# Patient Record
Sex: FEMALE | Race: WHITE | Employment: FULL TIME | URBAN - NONMETROPOLITAN AREA
[De-identification: names, ages, dates, MRNs, and addresses within clinical notes are randomized per-mention and may not be internally consistent; named-entity substitution may affect disease eponyms.]

---

## 2021-02-06 ENCOUNTER — HOSPITAL ENCOUNTER (INPATIENT)
Age: 31
LOS: 5 days | Discharge: NURSING, REHAB OR LONG-TERM CARE | DRG: 773 | End: 2021-02-11
Attending: INTERNAL MEDICINE | Admitting: FAMILY MEDICINE
Payer: COMMERCIAL

## 2021-02-06 DIAGNOSIS — F11.93 OPIATE WITHDRAWAL (HCC): ICD-10-CM

## 2021-02-06 LAB
ALBUMIN SERPL-MCNC: 3.9 G/DL (ref 3.5–4.7)
ALBUMIN/GLOB SERPL: 1.2 {RATIO}
ALP SERPL-CCNC: 63 U/L (ref 38–126)
ALT SERPL-CCNC: 19 U/L (ref 3–52)
AMPHET UR QL SCN: NEGATIVE
ANION GAP SERPL CALC-SCNC: 10 MMOL/L
APPEARANCE UR: CLEAR
AST SERPL W P-5'-P-CCNC: 20 U/L (ref 14–74)
BACTERIA URNS QL MICRO: ABNORMAL /HPF
BARBITURATES UR QL SCN: NEGATIVE
BASOPHILS # BLD: 0 K/UL (ref 0–0.1)
BASOPHILS NFR BLD: 0 % (ref 0–2)
BENZODIAZ UR QL: NEGATIVE
BILIRUB SERPL-MCNC: 0.4 MG/DL (ref 0.2–1)
BILIRUB UR QL: NEGATIVE
BUN SERPL-MCNC: 17 MG/DL (ref 9–21)
BUN/CREAT SERPL: 43
CA-I BLD-MCNC: 8.3 MG/DL (ref 8.5–10.5)
CANNABINOIDS UR QL SCN: NEGATIVE
CHLORIDE SERPL-SCNC: 105 MMOL/L (ref 94–111)
CO2 SERPL-SCNC: 20 MMOL/L (ref 21–33)
COCAINE UR QL SCN: NEGATIVE
COLOR UR: ABNORMAL
COVID-19 RAPID TEST, COVR: NOT DETECTED
CREAT SERPL-MCNC: 0.4 MG/DL (ref 0.7–1.2)
DRUG SCRN COMMENT,DRGCM: ABNORMAL
EOSINOPHIL # BLD: 0.2 K/UL (ref 0–0.4)
EOSINOPHIL NFR BLD: 2 % (ref 0–5)
EPITH CASTS URNS QL MICRO: ABNORMAL /LPF (ref 0–20)
ERYTHROCYTE [DISTWIDTH] IN BLOOD BY AUTOMATED COUNT: 14.2 % (ref 11.6–14.5)
ETHANOL PERCENT, ALCP: <0.004 %
ETHANOL SERPL-MCNC: <4 MG/DL
GLOBULIN SER CALC-MCNC: 3.3 G/DL
GLUCOSE SERPL-MCNC: 83 MG/DL (ref 70–110)
GLUCOSE UR STRIP.AUTO-MCNC: NEGATIVE MG/DL
HCG UR QL: NEGATIVE
HCT VFR BLD AUTO: 39.1 % (ref 35–45)
HGB BLD-MCNC: 12.3 G/DL (ref 12–16)
HGB UR QL STRIP: NEGATIVE
IMM GRANULOCYTES # BLD AUTO: 0 K/UL
IMM GRANULOCYTES NFR BLD AUTO: 0 %
KETONES UR QL STRIP.AUTO: NEGATIVE MG/DL
LEUKOCYTE ESTERASE UR QL STRIP.AUTO: ABNORMAL
LYMPHOCYTES # BLD: 2.3 K/UL (ref 0.9–3.6)
LYMPHOCYTES NFR BLD: 27 % (ref 21–52)
MCH RBC QN AUTO: 26.6 PG (ref 24–34)
MCHC RBC AUTO-ENTMCNC: 31.5 G/DL (ref 31–37)
MCV RBC AUTO: 84.6 FL (ref 74–97)
METHADONE UR QL: POSITIVE
MONOCYTES # BLD: 0.6 K/UL (ref 0.05–1.2)
MONOCYTES NFR BLD: 8 % (ref 3–10)
MUCOUS THREADS URNS QL MICRO: NEGATIVE /LPF
NEUTS SEG # BLD: 5.2 K/UL (ref 1.8–8)
NEUTS SEG NFR BLD: 63 % (ref 40–73)
NITRITE UR QL STRIP.AUTO: NEGATIVE
OPIATES UR QL: POSITIVE
OXYCODONE UR QL SCN: NEGATIVE
PCP UR QL: POSITIVE
PH UR STRIP: 5 [PH] (ref 5–9)
PLATELET # BLD AUTO: 267 K/UL (ref 135–420)
PMV BLD AUTO: 9 FL (ref 9.2–11.8)
POTASSIUM SERPL-SCNC: 4 MMOL/L (ref 3.2–5.1)
PROPOXYPH UR QL: NEGATIVE
PROT SERPL-MCNC: 7.2 G/DL (ref 6.1–8.4)
PROT UR STRIP-MCNC: NEGATIVE MG/DL
RBC # BLD AUTO: 4.62 M/UL (ref 4.2–5.3)
RBC #/AREA URNS HPF: ABNORMAL /HPF (ref 0–2)
SARS-COV-2, COV2: NORMAL
SARS-COV-2, COV2: NORMAL
SODIUM SERPL-SCNC: 135 MMOL/L (ref 135–145)
SP GR UR REFRACTOMETRY: 1.02 (ref 1–1.03)
SPECIMEN SOURCE: NORMAL
TRICYCLICS UR QL: NEGATIVE
UROBILINOGEN UR QL STRIP.AUTO: 0.2 EU/DL (ref 0.2–1)
WBC # BLD AUTO: 8.4 K/UL (ref 4.6–13.2)
WBC URNS QL MICRO: ABNORMAL /HPF (ref 0–4)

## 2021-02-06 PROCEDURE — 85025 COMPLETE CBC W/AUTO DIFF WBC: CPT

## 2021-02-06 PROCEDURE — 80307 DRUG TEST PRSMV CHEM ANLYZR: CPT

## 2021-02-06 PROCEDURE — 99222 1ST HOSP IP/OBS MODERATE 55: CPT | Performed by: FAMILY MEDICINE

## 2021-02-06 PROCEDURE — 82077 ASSAY SPEC XCP UR&BREATH IA: CPT

## 2021-02-06 PROCEDURE — U0003 INFECTIOUS AGENT DETECTION BY NUCLEIC ACID (DNA OR RNA); SEVERE ACUTE RESPIRATORY SYNDROME CORONAVIRUS 2 (SARS-COV-2) (CORONAVIRUS DISEASE [COVID-19]), AMPLIFIED PROBE TECHNIQUE, MAKING USE OF HIGH THROUGHPUT TECHNOLOGIES AS DESCRIBED BY CMS-2020-01-R: HCPCS

## 2021-02-06 PROCEDURE — 81025 URINE PREGNANCY TEST: CPT

## 2021-02-06 PROCEDURE — HZ2ZZZZ DETOXIFICATION SERVICES FOR SUBSTANCE ABUSE TREATMENT: ICD-10-PCS | Performed by: FAMILY MEDICINE

## 2021-02-06 PROCEDURE — 74011250637 HC RX REV CODE- 250/637: Performed by: FAMILY MEDICINE

## 2021-02-06 PROCEDURE — 81001 URINALYSIS AUTO W/SCOPE: CPT

## 2021-02-06 PROCEDURE — 80053 COMPREHEN METABOLIC PANEL: CPT

## 2021-02-06 PROCEDURE — 65270000029 HC RM PRIVATE

## 2021-02-06 PROCEDURE — 86592 SYPHILIS TEST NON-TREP QUAL: CPT

## 2021-02-06 PROCEDURE — 87635 SARS-COV-2 COVID-19 AMP PRB: CPT

## 2021-02-06 RX ORDER — CALCIUM CARBONATE 200(500)MG
400 TABLET,CHEWABLE ORAL
Status: DISCONTINUED | OUTPATIENT
Start: 2021-02-06 | End: 2021-02-11

## 2021-02-06 RX ORDER — IBUPROFEN 200 MG
1 TABLET ORAL DAILY
Status: DISCONTINUED | OUTPATIENT
Start: 2021-02-06 | End: 2021-02-08

## 2021-02-06 RX ORDER — CYCLOBENZAPRINE HCL 10 MG
10 TABLET ORAL
Status: DISCONTINUED | OUTPATIENT
Start: 2021-02-06 | End: 2021-02-11

## 2021-02-06 RX ORDER — HYDROXYZINE PAMOATE 25 MG/1
50 CAPSULE ORAL
Status: DISCONTINUED | OUTPATIENT
Start: 2021-02-06 | End: 2021-02-11

## 2021-02-06 RX ORDER — IBUPROFEN 600 MG/1
600 TABLET ORAL
Status: DISCONTINUED | OUTPATIENT
Start: 2021-02-06 | End: 2021-02-11

## 2021-02-06 RX ORDER — TRAZODONE HYDROCHLORIDE 50 MG/1
100 TABLET ORAL
Status: DISCONTINUED | OUTPATIENT
Start: 2021-02-06 | End: 2021-02-07

## 2021-02-06 RX ORDER — DOCUSATE SODIUM 100 MG/1
100 CAPSULE, LIQUID FILLED ORAL
Status: DISCONTINUED | OUTPATIENT
Start: 2021-02-06 | End: 2021-02-11

## 2021-02-06 RX ORDER — LANOLIN ALCOHOL/MO/W.PET/CERES
9 CREAM (GRAM) TOPICAL
Status: DISCONTINUED | OUTPATIENT
Start: 2021-02-06 | End: 2021-02-11

## 2021-02-06 RX ORDER — DICYCLOMINE HYDROCHLORIDE 10 MG/1
10 CAPSULE ORAL
Status: DISCONTINUED | OUTPATIENT
Start: 2021-02-06 | End: 2021-02-11

## 2021-02-06 RX ORDER — IBUPROFEN 200 MG
1 TABLET ORAL DAILY
Status: DISCONTINUED | OUTPATIENT
Start: 2021-02-07 | End: 2021-02-06

## 2021-02-06 RX ORDER — CLONIDINE HYDROCHLORIDE 0.1 MG/1
0.1 TABLET ORAL
Status: DISCONTINUED | OUTPATIENT
Start: 2021-02-06 | End: 2021-02-11

## 2021-02-06 RX ORDER — MAG HYDROX/ALUMINUM HYD/SIMETH 200-200-20
30 SUSPENSION, ORAL (FINAL DOSE FORM) ORAL
Status: DISCONTINUED | OUTPATIENT
Start: 2021-02-06 | End: 2021-02-11

## 2021-02-06 RX ORDER — CALCIUM CARBONATE 200(500)MG
200 TABLET,CHEWABLE ORAL
Status: DISCONTINUED | OUTPATIENT
Start: 2021-02-06 | End: 2021-02-06

## 2021-02-06 RX ORDER — ACETAMINOPHEN 325 MG/1
650 TABLET ORAL
Status: DISCONTINUED | OUTPATIENT
Start: 2021-02-06 | End: 2021-02-11

## 2021-02-06 RX ORDER — ONDANSETRON 4 MG/1
8 TABLET, ORALLY DISINTEGRATING ORAL
Status: DISCONTINUED | OUTPATIENT
Start: 2021-02-06 | End: 2021-02-11

## 2021-02-06 RX ORDER — GUAIFENESIN 100 MG/5ML
100 SOLUTION ORAL
Status: DISCONTINUED | OUTPATIENT
Start: 2021-02-06 | End: 2021-02-11

## 2021-02-06 RX ADMIN — TRAZODONE HYDROCHLORIDE 100 MG: 50 TABLET ORAL at 21:52

## 2021-02-06 NOTE — PROGRESS NOTES
Received for care 35 y.o. female with Acute Opioid withdrawal. Admit to using methadone 19 mg and fentanyl 0.4 gm yesterday about 10 AM and use daily. Report of feeling anxious and nose running. Skin noted flushed. COWS score 11. COVID-19 test and Droplet isolation explained. Verbalize understanding. Dr. Rogelio Bolton notified of patient's arrival and current condition. Will complete assessments and orders on next rounds. Marissa Patel

## 2021-02-06 NOTE — BH NOTES
Chemical Dependency/Substance Abuse Therapist/Counselor Documentation    /THERAPIST PROGRESS NOTE    CURRENT STATUS OF PATIENT: Orientation (check one) [x] Person [x] Place [x] Time [x] Purpose    (Patient voiced concerns: Patient is alert and oriented x4. No concerns at this time.)    Attitude/Behavior toward Examiner:   [x] Appropriate [x] Cooperative [] Aggressive [] Argumentative [] Angry [] Apathetic [] Passive  [] Childlike [] Interactive [] Guarded [] Demanding [] Dramatic [] Evasive [] Hostile [] Irritable [] Manipulative   [] Uncooperative [] Difficult to redirect [] Isolative-Withdrawal [] Sad-Tearful [] Suspicious [] Defensive    TREATMENT PLAN PROBLEM BEING ADDRESS: Patient presented to the unit for opioid detox.     MODALITY:  [x] Individual Therapy  [] Group Therapy  [] Family Therapy with Client Present  [] Family Therapy without Client Present  [] Multi-Family Group Therapy  [] Spirituality Psychotherapy Group  [] Psycho-Edutherapy Group    RISK OF HARM:    [x] None identified    [] Self injurious behavior  [] Threatening others without a plan   [] Actively Suicidal with plan  [] Threatening others with plan  [] Suicidal Thoughts without plan [] Weapon in the home  [] Means to complete a plan    Comments: No risk assessed,  General Appearance: [x] Normal [] Disheveled [] Emaciated [] Obese [] Poor Hygiene    Dress: [x] Appropriate [] Eccentric [] Seductive [] Bizarre    Mood: [x] Euthymic/normal [] Dysphoric/unease [] Anxious [] Agitated [] Dysthymic [] Depressed [] Euphoric [] Pleasant  [] Bright [] Angry [] Manic    Affect: [x] Appropriate [] Inappropriate [] Labile [] Constricted [] Blunted [] Flat [] Lethargic [] Preoccupied    THOUGHT CONTENT:   [x] Remains focused on topic/thought control [] Unable to remain focused on topic [] Blocking [] Disorganized  [] Confused [] Preoccupied [] Flight of ideas [] Tangential [] Delusional thought content [] Persecutory  [] Bizarre [] Grandeur [] Guilt [] Somatic     PERCEPTION: [x] Normal [] Hallucinations [] Auditory [] Visual [] Tactile [] Olfactory/smell [] Gustatory/taste    Briefly summarize the specifics of the identified symptoms and/or behaviors listed and progress towards improvement and/or group interactions: Patient presented with a normal, stable mood and perception. Patient was able to remain focused on topic, and was able to demonstrate thought control. Patient was dressed appropriately for the season and setting. MET (Motivational Enhancement Therapy): Based on discussions and interactions with the patient the patient falls within the following stage of treatment:   [] Pre-contemplation: Denial of illness with no need to change  [] Contemplation: Awareness of problem and considering change  [x] Determination: Willing to change and open to possible change  [] Action: Actively involved in recovery/treatment demonstrates a willing to make plans to change  [] Relapse Prevention: Able to verbalize plans to prevent relapse, 7 day plan    Briefly summarize the patient's interactions/discussions indicating the current or change in level of therapeutic treatment: Patient presents in the determination/preparation stage of change. Patient is aware of her substance problem, and is willing to change. Patient is interested in continuing care at [facility] in [place], [place] once she completes detox. TREATMENT PROVIDED:  [x] Developed/Reviewed Crisis Prevention Plan  [] Discussed/Reviewed Treatment Goals on Treatment Plan  [x] Discussed/Reviewed Discharge Planning   [] Discussed/Reviewed the patient goals of treatment  [] Refused/Unable to participate in discharge planning [] Other:   RECOMMENDATIONS: [] Change current therapeutic focus  [] Change treatment goals/objectives on the treatment plan    CONTINUED PLAN OF CARE: Completely detox from opioids, and maintain a recovery system that is free from substance use.     DISCHARGE PLANNING:   [x] Has identified a family support system   [] Patient has not identified a family support system  [x] Has connected with sober/clean support system  [] Patient has not connected with sober/clean support system  [] Patient uses special equipment at home and equipment is in the home  [x] Has identified community support    [] Patient has not been able to identify community support  Barriers to Discharge:  [] Difficulty returning to present living arrangement  [] Will require assistance with placement post discharge  [] There is no local substance abuse disorder programs available to the patient  [] Needs referral to [facility]. Discharge Planning Comments: Patient is interested in discharging to [facility] in [facility]. Counselor also provided patient with information on other inpatient units.       No Adler, MEHRAN, LMHP-S, CSAC-C  Documentation reviewed by Dr. Veronica Patterson, Ed.D., LPC, LSATP, CCS, CAADC, CSAC, QMHP-A

## 2021-02-06 NOTE — H&P
History and physical 2021  History of present illness the patient is a 67-year-old [place] American female  1 para 1 abortus 1 status post  to a 3year-old child. I think she is from [place]. She is a smoker. She has had no falls or injuries. She is here for detox from opiates. She is taking methadone 19 mg a day and apparently every other day he is using some fentanyl not exactly sure how much. She has had no falls or injuries. Eating relatively well she has had some anxiety runny nose and feels flushed. She has been in 128-day program in the [facility] in the past.  She denies any chest pain or shortness of breath. She is eating appropriately. No falls or injuries no rash no syncope or loss of consciousness. They recommended her coming to our facility. She tells me that she was on as much as 75 mg of methadone but she is decided to decrease herself and ultimately this is the point at which she started using fentanyl because she felt the 19 mg was not enough. Not really sure about that whole story. She says she last used yesterday at 10 AM.  She had a GUSTAVO score today of 11  Past medical history she is  she had a  otherwise medically stable. No medication allergies. She has a history she states has seen multiple counselors who have told her she was bipolar or had significant major depression but nobody has her on medicine at this time. No allergies to medicine. Family history unremarkable  Social history positive tobacco no alcohol she has used substances in the past.  Review of systems General anxious with runny nose  GI no nausea vomiting diarrhea  Pulmonary no cough or cold  Skin no rashes at all.    no urinary tract symptomatology  Neurologically no headache no loss of consciousness normal gait  HEENT exam runny nose  Musculoskeletal no complaints of discomfort  Physical exam blood pressure 128/80 pulse 84 respiration 16 the lungs are clear the abdomen is soft. Extremities are clear she has no edema she is oriented x3 she has a regular rate and rhythm with no murmurs the abdomen is soft no definite masses and no family adenopathy her gait seems to be appropriate there is no edema there is no rash.   She is oriented x3 no acute distress  Assessment #1 opiate withdrawal #2 chronic opiate dependence #3 chronic tobacco dependence 4 questionable history of substance use  Plan: Patient will be admitted for detox from opiate withdrawal we will follow her on a daily basis she is medically stable at this time blood work will be done at the present time

## 2021-02-06 NOTE — PROGRESS NOTES
Patient arrival from home, ambulatory, alert and oriented x 4, escorted by staff. COVID-19 tests done and send to lab via staff.

## 2021-02-07 PROCEDURE — 74011250637 HC RX REV CODE- 250/637: Performed by: FAMILY MEDICINE

## 2021-02-07 PROCEDURE — 65270000029 HC RM PRIVATE

## 2021-02-07 PROCEDURE — 86580 TB INTRADERMAL TEST: CPT | Performed by: FAMILY MEDICINE

## 2021-02-07 PROCEDURE — 74011000302 HC RX REV CODE- 302: Performed by: FAMILY MEDICINE

## 2021-02-07 PROCEDURE — 99231 SBSQ HOSP IP/OBS SF/LOW 25: CPT | Performed by: FAMILY MEDICINE

## 2021-02-07 PROCEDURE — 74011636637 HC RX REV CODE- 636/637: Performed by: FAMILY MEDICINE

## 2021-02-07 RX ORDER — BUPRENORPHINE 2 MG/1
4 TABLET SUBLINGUAL 2 TIMES DAILY
Status: COMPLETED | OUTPATIENT
Start: 2021-02-07 | End: 2021-02-08

## 2021-02-07 RX ADMIN — TUBERCULIN PURIFIED PROTEIN DERIVATIVE 5 UNITS: 5 INJECTION, SOLUTION INTRADERMAL at 09:10

## 2021-02-07 RX ADMIN — BUPRENORPHINE 4 MG: 2 TABLET SUBLINGUAL at 20:42

## 2021-02-07 RX ADMIN — Medication 9 MG: at 23:59

## 2021-02-07 NOTE — BH NOTES
D: Staff attempt to support Sury with the group discussion and activity that focused on identifying her triggers to prevent relapse upon discharge.      A: Jasper Cast declined participation upon discharge, stating that she was feeling \"exhausted\"      P: Currently, Jsaper Cast is not progressing towards her treatment goal of developing a set of skills to assist in understanding the factors which contribute to the development of her addiction.        Mitch Peterson, MEHRAN, LMHP-S, CSAC-C  Documentation reviewed by Dr. Hugo Prater, Ed.D., LPC, LSATP, CCS, CAADC, CSAC, QMHP-A

## 2021-02-07 NOTE — ROUTINE PROCESS
Verbal shift change report given to YAKOV Crawford RN (oncoming nurse) by IVON Peters RN (offgoing nurse). Report included the following information SBAR, Kardex, Intake/Output, MAR and Recent Results.

## 2021-02-07 NOTE — PROGRESS NOTES
Problem: Patient Education: Go to Patient Education Activity  Goal: Patient/Family Education  Outcome: Progressing Towards Goal     Problem: Falls - Risk of  Goal: *Absence of Falls  Description: Document Irina Blood Fall Risk and appropriate interventions in the flowsheet.   Outcome: Progressing Towards Goal  Note: Fall Risk Interventions:            Medication Interventions: Teach patient to arise slowly     Pt verbalized understanding

## 2021-02-07 NOTE — BH NOTES
D: Staff attempt to support Sury with the group discussion and activity on creating a 7 day plan to prevent relapse upon discharge. A: Charlene Chase declined participation upon discharge, stating that she was feeling \"exhausted\"     P: Currently, Charlene Chase is not progressing towards her treatment goal of developing a set of skills to assist in understanding the factors which contribute to the development of her addiction.       Adonay Vo, MEHRAN, LMHP-S, CSAC-C  Documentation reviewed by Dr. Diana Jackson, EdNannetteD., LPC, LSATP, CCS, CAADC, CSAC, QMHP-A

## 2021-02-07 NOTE — PROGRESS NOTES
Problem: Opiate Withdrawal  Goal: *STG: Complies with medication therapy  Outcome: Progressing Towards Goal   Patient has not began Subutex taper states she does not feel that she is in withdrawals. Some concern with taking medications to early. Will continue to monitor. Problem: Opiate Withdrawal  Goal: *STG: Attends activities and groups  Outcome: Progressing Towards Goal   Patient has not had a desire to attend group sessions the counselor has been with her to assist her in through her concerns. Problem: Opiate Withdrawal  Goal: *STG: Seeks staff when symptoms of withdrawal increase  Outcome: Progressing Towards Goal  Problem: Opiate Withdrawal  Goal: *STG: Maintains appropriate nutrition and hydration  Outcome: Progressing Towards Goal   Patient has eaten very poorly today. She has had a visit from the Dietary also today.

## 2021-02-07 NOTE — PROGRESS NOTES
Multidisciplinary meeting completed with Dr. Mary Forte, *SHANNON Chi RN and DERIK Bhakta. Advised to assess for withdrawal management and initiation of taper. . Plan is to assist patient with progression towards treatment goals to include Relapse prevention, and stage of change, coping skills and the 12 Step process. All in agreement with plan.

## 2021-02-07 NOTE — PROGRESS NOTES
Progress note 2/7/2021  Subjectively the patient is a 67-year-old female who is seen for acute opiate withdrawal.  We have ordered her synthetic Suboxone but she is not been ready to take it. She says sometimes it takes 2 or 3 days before she feels significant withdrawals. She is insistent she has been using either fentanyl or methadone on a daily basis. Insists that she had been weaning herself from the methadone from 75 mg to 19 mg. Been eating. She has had no vomiting or diarrhea  Objectively blood pressure 102/58 pulse is 68 respirations 18 temperature 98 her cow score is 6 the lungs are clear the abdomen is benign no significant distress. There is no rash there is no edema she is oriented x3  Assessment acute opioid withdrawal, chronic opioid dependence, chronic tobacco dependence  Plan: We had a discussion with the patient that if she is not symptomatic tomorrow she will need to be discharged. She is aware of our concerns. We will follow up on an as-needed basis.

## 2021-02-07 NOTE — PROGRESS NOTES
Nutrition Note    Pt reports her appetite is good and is satisfied with meals being provided.      Electronically signed by Manas Tan on 2/7/2021 at 10:30 AM    Contact: TRISTAN [phone]

## 2021-02-07 NOTE — ROUTINE PROCESS
Bedside and Verbal shift change report given to SHANNON Chi RN (oncoming nurse) by Lloyd Angel RN (offgoing nurse). Report included the following information MAR, Recent Results and Quality Measures.

## 2021-02-08 LAB
RPR SER QL: NONREACTIVE
SARS-COV-2, COV2NT: NOT DETECTED

## 2021-02-08 PROCEDURE — 74011250637 HC RX REV CODE- 250/637: Performed by: FAMILY MEDICINE

## 2021-02-08 PROCEDURE — 74011636637 HC RX REV CODE- 636/637: Performed by: FAMILY MEDICINE

## 2021-02-08 PROCEDURE — 65270000029 HC RM PRIVATE

## 2021-02-08 PROCEDURE — 99231 SBSQ HOSP IP/OBS SF/LOW 25: CPT | Performed by: FAMILY MEDICINE

## 2021-02-08 RX ORDER — BUPRENORPHINE 2 MG/1
6 TABLET SUBLINGUAL 2 TIMES DAILY
Status: COMPLETED | OUTPATIENT
Start: 2021-02-08 | End: 2021-02-09

## 2021-02-08 RX ORDER — BUPRENORPHINE 2 MG/1
2 TABLET SUBLINGUAL 2 TIMES DAILY
Status: COMPLETED | OUTPATIENT
Start: 2021-02-10 | End: 2021-02-11

## 2021-02-08 RX ORDER — BUPRENORPHINE 2 MG/1
4 TABLET SUBLINGUAL 2 TIMES DAILY
Status: COMPLETED | OUTPATIENT
Start: 2021-02-09 | End: 2021-02-10

## 2021-02-08 RX ADMIN — HYDROXYZINE PAMOATE 50 MG: 25 CAPSULE ORAL at 01:54

## 2021-02-08 RX ADMIN — BUPRENORPHINE 4 MG: 2 TABLET SUBLINGUAL at 08:44

## 2021-02-08 RX ADMIN — BUPRENORPHINE 6 MG: 2 TABLET SUBLINGUAL at 20:41

## 2021-02-08 RX ADMIN — ONDANSETRON 8 MG: 4 TABLET, ORALLY DISINTEGRATING ORAL at 11:56

## 2021-02-08 NOTE — BH NOTES
Chemical Dependency/Substance Abuse Therapist/Counselor Documentation    /THERAPIST PROGRESS NOTE    CURRENT STATUS OF PATIENT: Orientation (check one) [x] Person [x] Place [x] Time [x] Purpose    (Patient voiced concerns: No concerns noted. Patient was alert and oriented x4.)    Attitude/Behavior toward Examiner:   [] Appropriate [] Cooperative [] Aggressive [] Argumentative [] Angry [] Apathetic [] Passive  [] Childlike [] Interactive [x] Guarded [] Demanding [] Dramatic [] Evasive [] Hostile [] Irritable [] Manipulative   [] Uncooperative [] Difficult to redirect [x] Isolative-Withdrawal [x] Sad-Tearful [] Suspicious [] Defensive    TREATMENT PLAN PROBLEM BEING ADDRESS: Relapse Prevention and MAT. MODALITY:  [x] Individual Therapy  [x] Group Therapy  [] Family Therapy with Client Present  [] Family Therapy without Client Present  [] Multi-Family Group Therapy  [x] Spirituality Psychotherapy Group  [x] Psycho-Edutherapy Group    RISK OF HARM:    [x] None identified    [] Self injurious behavior  [] Threatening others without a plan   [] Actively Suicidal with plan  [] Threatening others with plan  [] Suicidal Thoughts without plan [] Weapon in the home  [] Means to complete a plan    Comments: No risked assessed. Patient denied suicidal and homicidal ideations and gestures.   General Appearance: [x] Normal [] Disheveled [] Emaciated [] Obese [] Poor Hygiene    Dress: [x] Appropriate [] Eccentric [] Seductive [] Bizarre    Mood: [] Euthymic/normal [] Dysphoric/unease [] Anxious [] Agitated [] Dysthymic [x] Depressed [] Euphoric [] Pleasant  [] Bright [] Angry [] Manic    Affect: [] Appropriate [] Inappropriate [] Labile [] Constricted [] Blunted [x] Flat [] Lethargic [] Preoccupied    THOUGHT CONTENT:   [x] Remains focused on topic/thought control [] Unable to remain focused on topic [] Blocking [] Disorganized  [] Confused [] Preoccupied [] Flight of ideas [] Tangential [] Delusional thought content [] Persecutory  [] Bizarre [] Grandeur [] Guilt [] Somatic     PERCEPTION: [x] Normal [] Hallucinations [] Auditory [] Visual [] Tactile [] Olfactory/smell [] Gustatory/taste    Briefly summarize the specifics of the identified symptoms and/or behaviors listed and progress towards improvement and/or group interactions: Patient presented with a normal perception and was able to demonstrate thought control by remaining focused on topic. Patient presented with a flat affect and a depressed mood. MET (Motivational Enhancement Therapy): Based on discussions and interactions with the patient the patient falls within the following stage of treatment:   [] Pre-contemplation: Denial of illness with no need to change  [x] Contemplation: Awareness of problem and considering change  [] Determination: Willing to change and open to possible change  [] Action: Actively involved in recovery/treatment demonstrates a willing to make plans to change  [] Relapse Prevention: Able to verbalize plans to prevent relapse, 7 day plan    Briefly summarize the patient's interactions/discussions indicating the current or change in level of therapeutic treatment: Patient verbalized not knowing what she should do after completing detox. Patient verbalized that she wanted to continue to treatment at a long term program, but is hesitant about continuing her sobriety without the assistance with medications (suboxone). Patient shared that she has attempted recovery without the use of medications, and relapsed.      TREATMENT PROVIDED:  [x] Developed/Reviewed Crisis Prevention Plan  [] Discussed/Reviewed Treatment Goals on Treatment Plan  [] Discussed/Reviewed Discharge Planning   [] Discussed/Reviewed the patient goals of treatment  [] Refused/Unable to participate in discharge planning [] Other:     RECOMMENDATIONS: [x] Change current therapeutic focus  [] Change treatment goals/objectives on the treatment plan    CONTINUED PLAN OF CARE: Clinician encouraged patient to utilizing her sober support system to assist with relapse prevention planning, including staff. Clinician also encouraged patient to ask staff any questions that she might have in regards to discharge planning. DISCHARGE PLANNING:   [x] Has identified a family support system   [] Patient has not identified a family support system  [x] Has connected with sober/clean support system  [] Patient has not connected with sober/clean support system  [] Patient uses special equipment at home and equipment is in the home  [x] Has identified community support    [] Patient has not been able to identify community support  Barriers to Discharge:  [] Difficulty returning to present living arrangement  [] Will require assistance with placement post discharge  [] There is no local substance abuse disorder programs available to the patient  [x] Needs referral to long term treatment center  Discharge Planning Comments: Patient is still interested in long term treatment, but would like to know if she would be able to take suboxone at the treatment facility.       Karolyn Albright, MSW, LMHP-S, CSAC-C  Documentation reviewed by Dr. Larina Hammans, Ed.D., LPC, LSATP, CCS, CAADC, CSAC, QMHP-A

## 2021-02-08 NOTE — PROGRESS NOTES
Patient has improved mood and has even been smilng. She has gone out for a break and been more social and returned good eye contact as well as engaged in conversations. She has had a very poor appetite but has been excepting liquids. Will continue to monitor mental health as well as intake.

## 2021-02-08 NOTE — ROUTINE PROCESS
Verbal shift change report given to YAKOV Esquivel RN (oncoming nurse) by Autumn Santana. STORMY Chi (offgoing nurse). Report included the following information SBAR, Kardex, Intake/Output, MAR, Recent Results and Quality Measures.

## 2021-02-08 NOTE — GROUP NOTE
[facility] GROUP DOCUMENTATION INDIVIDUAL 
 
 
                                                                    Group Therapy Note 
 
Date: 2/7/2021 
 
Group Start Time: 1845 
Group End Time: 1930 
Group Topic: Relapse Prevention/Role Play Group 
 
SHF 3 DETOX 
 
Adelfo Daisha ESQUIVEL  GROUP DOCUMENTATION GROUP 
 
Group Therapy Note 
 
Attendees: 1 
 
  
 
Attendance: Attended 
 
Patient's Goal:  To enhance communication skills while in the recovery process. Sury will work towards verbalizing her emotions and feelings to her support system when feeling triggered and experiencing cravings. 
 
Interventions/techniques: Informed, Promoted peer support, Provide feedback and Supported 
 
Follows Directions: Followed directions 
 
Interactions: Interacted appropriately 
 
Mental Status: Anxious, Depressed and Flat 
 
Behavior/appearance: Attentive and Cooperative 
 
Goals Achieved: Able to engage in interactions, Able to listen to others and Able to reflect/comment on own behavior 
 
 
Additional Notes:   
 
D: Sury with the discussion on communication skills when balancing recovery and managing her emotions.  
 
A: Sury was able to identify potential conflict that may arise while trying to maintain balance while in the early stages of recovery. Sury was was supported with tips on how to reduce tension and early conflict in recovery, and how to avoid work-related difficulties undermining early sobriety.  
 
P: Sury is progressing towards her treatment goals of developing the skills necessary to prevent relapse and maintaining a sober lifestyle.  
 
Daisha Emanuel, MSW, LMHP-S, CSAC-C 
Documentation reviewed by Dr. Renetta Gonzalez Ed.D., LPC, LSATP, CCS, CAADC, CSAC, QMHP-A

## 2021-02-08 NOTE — PROGRESS NOTES
Patient in restroom with head in toilet dry heaves and vomited yellowish colored emesis. Face and mouth cleaned and patient insisted on remaining in the floor. Assisted up and to Shower. Found patient sitting in the shower on the floor which is were she remained for 30 minutes.  instructed her to complete her shower at which time she was reluctant to come out. Boundaries were set and patient out of shower as agreed with staff. She admitted to some increasing anxiety denied any suicidal ideations states sitting in the shower makes her feel better. Offered medications for nausea which she stated would not help but took it only to return it from her mouth stating it will make me throw up again. Will continue to monitor patient closely.

## 2021-02-08 NOTE — PROGRESS NOTES
Problem: Patient Education: Go to Patient Education Activity  Goal: Patient/Family Education  Outcome: Progressing Towards Goal     Discussed nutritional needs and benefits. Patient verbalized understanding. Problem: Falls - Risk of  Goal: *Absence of Falls  Description: Document Guy Jenkins Fall Risk and appropriate interventions in the flowsheet.   Outcome: Progressing Towards Goal  Note: Fall Risk Interventions:            Medication Interventions: Teach patient to arise slowly

## 2021-02-08 NOTE — PROGRESS NOTES
Pt was medicated for c/o insomnia at 2359. At 0130 pt was still not asleep and began to c/o feeling increased anxiety, diffuse joint pain and yawning and sniffling. Pt was given Vistaril for anxiety complaint @ 0154.

## 2021-02-08 NOTE — PROGRESS NOTES
Progress note 2/8/2021  Subjectively the patient is seen today for evaluation. She was given buprenorphine last night. Her history is of methadone and fentanyl use. She required some Vistaril for some anxiety. He has had no falls or injuries. Bowel movements are appropriate. A lot of anxiety shaking and yawning and aching. Objectively blood pressure 126/76 pulse 80 respirations 18 the cow score is 11 the lungs are clear the abdomen is benign. The extremities are clear pleasant and cooperative in no significant distress. She is complaining of some aching she is a little sweaty today. No edema. Assessment: #1 acute opiate withdrawal, #2 chronic opiate dependence #3 chronic tobacco dependence  Plan: She is using buprenorphine. We will follow up daily she is medically stable at this point. She has not made decisions about the next step at this point.

## 2021-02-08 NOTE — GROUP NOTE
[facility] GROUP DOCUMENTATION INDIVIDUAL Group Therapy Note Date: 2/7/2021 Group Start Time: 3848 Group End Time: 6742 Group Topic: Relapse Prevention/Role Play Group SHF 3 DETOX Yovany Mukherjee [facility] GROUP DOCUMENTATION GROUP Group Therapy Note Attendees: 1 Attendance: Attended Patient's Goal:  To apply the 12 step process to recovery. Interventions/techniques: Supported Follows Directions: Followed directions Interactions: Interacted appropriately Mental Status: Anxious, Depressed and Flat Behavior/appearance: Cooperative Goals Achieved: Able to engage in interactions, Able to listen to others, Able to receive feedback, Able to self-disclose and Discussed discharge plans Additional Notes:   
 
D: Tila Ramirez was supported in the group discussion on the 12 steps. A: Tila Ramirez was able to verbalize understanding that substance use is a disease that could be treated by a system of applying spiritual values to daily living. P: Tila Ramirez is progressing towards her treatment goal of developing the skills necessary to maintain a sober lifestyle and tools to prevent relapse. Jian Coronado, MSW, LMHP-S, CSAC-C Documentation reviewed by Dr. Melissa Baum, Ed.D., LPC, LSATP, CCS, CAADC, CSAC, QMHP-A

## 2021-02-08 NOTE — GROUP NOTE
[facility] GROUP DOCUMENTATION INDIVIDUAL Group Therapy Note Date: 2/8/2021 Group Start Time: 1400 Group End Time: 1430 Group Topic: Education Group - Inpatient SHF 3 DETOX Mary Grace Palma [facility] GROUP DOCUMENTATION GROUP Group Therapy Note Topic: Definition of Addiction Attendees:1 Attendance: Attended Patient's Goal:  Identify lifestyle changes to support long term sobriety such as vocation, employment, education, and legal issues Interventions/techniques: Informed, Provide feedback and Supported Follows Directions: Followed directions Interactions: Disorganized interaction Mental Status: Calm, Flat and Restricted Behavior/appearance: Needed prompting, Poor eye contact and Withdrawn/quiet Goals Achieved: Able to receive feedback, Identified relapse prevention strategies and Identified resources and support systems Additional Notes:   
 
D: Patient was supported in gaining an understanding that \"Addiction is a treatable, chronic medical disease involving complex interactions among brain circuits, genetics, the environment, and an individuals life experiences. People with addiction use substances or engage in behaviors that become compulsive and often continue despite harmful consequences. Prevention efforts and treatment approaches for addiction are generally as successful as those for other chronic diseases (ASA, September 15, 2019). Zora Bender was somewhat resistant to participated during the group discussion, she was still experiencing withdrawal symptoms. She was able to verbalize an understanding of addiction. A: Sury's withdrawal symptoms are still present as evidence by self-report and observation. She has little to no motivation to do anything other than sleep. P:  Continue to provide support to [facility]  And encourage her to get out of the room and move around and eat. Will attend one more group and meet and leave her room. Richard Carlson

## 2021-02-08 NOTE — PROGRESS NOTES
Problem: Opiate Withdrawal  Goal: *STG: Complies with medication therapy  Outcome: Progressing Towards Goal   Patient is on day 3 of her withdrawal management. She is progressing well and having more withdrawal symptoms. Encouraged to verbalize increasing withdrawal symptoms.

## 2021-02-08 NOTE — ROUTINE PROCESS
Bedside and Verbal shift change report given to Umm Landrum RN (oncoming nurse) by [place]. Mingo Orellana RN(offgoing nurse). Report included the following information Intake/Output, Recent Results and Quality Measures.

## 2021-02-08 NOTE — GROUP NOTE
[facility] GROUP DOCUMENTATION INDIVIDUAL Group Therapy Note Date: 2/8/2021 Group Start Time: 0566 Group End Time: 1000 Group Topic: [employer] SHF 3 DETOX Jordin Irene [facility] GROUP DOCUMENTATION GROUP Group Therapy Note Topic: Community Attendees: 1 Attendance: Attended Patient's Goal:  1 Interventions/techniques: Informed Follows Directions: Followed directions Interactions: Interacted appropriately Mental Status: depressed mood Behavior/appearance: Agitated and Disheveled Goals Achieved: Discussed coping and Identified resources and support systems Additional Notes:   
D:  The counselor discussed the daily schedule, reviewed program rules and regulations with the patient. The patient reported opioid  withdrawal symptoms. The patient was supported by counselor and nurse with hygiene. The patient was experiencing nausea and and chills. The patient provided information for her follow on residential treatment that she received a scholarship to in [place], [place]. The counselor and patient discussed treatment options that will support the patient treatment needs. A:  The patient withdrawal symptoms are still present as evidence by self-report and observation. She reports and interest in continuing treatment at a Residential facility upon discharge. The patient appeared to be somewhat sad and wanting to sleep. She expressed that she has been experiencing depressed moods but she feels that her withdrawal symptoms are primary. P:  The medical staff will continue to monitor and assess the patients withdrawal symptoms. The will contact [id number] [facility] for availability to support the patients request and discharge plan. Counselor will also explore services with [facility] and Nannette Gutiérrez

## 2021-02-09 PROCEDURE — 65270000029 HC RM PRIVATE

## 2021-02-09 PROCEDURE — 74011250637 HC RX REV CODE- 250/637: Performed by: FAMILY MEDICINE

## 2021-02-09 PROCEDURE — 74011636637 HC RX REV CODE- 636/637: Performed by: FAMILY MEDICINE

## 2021-02-09 PROCEDURE — 99231 SBSQ HOSP IP/OBS SF/LOW 25: CPT | Performed by: FAMILY MEDICINE

## 2021-02-09 RX ADMIN — BUPRENORPHINE 4 MG: 2 TABLET SUBLINGUAL at 21:00

## 2021-02-09 RX ADMIN — HYDROXYZINE PAMOATE 50 MG: 25 CAPSULE ORAL at 22:50

## 2021-02-09 RX ADMIN — BUPRENORPHINE 6 MG: 2 TABLET SUBLINGUAL at 08:17

## 2021-02-09 RX ADMIN — Medication 9 MG: at 22:50

## 2021-02-09 RX ADMIN — HYDROXYZINE PAMOATE 50 MG: 25 CAPSULE ORAL at 09:41

## 2021-02-09 NOTE — PROGRESS NOTES
Dr. Vel Roblero is in for assessment. Plan is to continue Subutex taper. Eating and drinking fluids encouraged.

## 2021-02-09 NOTE — PROGRESS NOTES
Patient presents in clean clothes, hygiene is fair. Patient denies SI/HI. Denies any hallucinations. She is medication compliant. Cooperative with staff and participating in treatment. Patient's appetite is increasing some, sleep is \"not great\". VS WDL. She is currently watching television in her room. Will continue to monitor for safety and well being, and offer support when needed.

## 2021-02-09 NOTE — ROUTINE PROCESS
Shift change report given to Carlos Mark RN by Herbie Mcclain RN. Report included the following information Kardex, MAR and Recent Results.

## 2021-02-09 NOTE — ROUTINE PROCESS
Verbal shift change report given to Jeevan Stokes (oncoming nurse) by Bebeto Alvarez RN (offgoing nurse). Report included the following information Kardex, Intake/Output and MAR.

## 2021-02-09 NOTE — GROUP NOTE
[facility] GROUP DOCUMENTATION INDIVIDUAL Group Therapy Note Date: 2/8/2021 Group Start Time: 0661 Group End Time: 1800 Group Topic: Education Group - Inpatient Topic:  Grounding SHF 3 DETOX Jackie Saul [facility] GROUP DOCUMENTATION GROUP Group Therapy Note Attendees: 2 Attendance: Attended Patient's Goal: Attends activities and groups, Verbalizes abstinence as an achievable goal, Agrees to participate in outpatient after care program to support ongoing mental health Interventions/techniques: Informed, Validated, Provide feedback, Reinforced and Supported Follows Directions: Followed directions Interactions: Interacted appropriately Mental Status: Calm Behavior/appearance: Attentive and Cooperative Goals Achieved: Able to reflect/comment on own behavior, Able to receive feedback, Able to self-disclose, Discussed discharge plans, Identified relapse prevention strategies, Identified resources and support systems and Verbalized increased hopefulness Additional Notes: 
Ping Alcazar  was supported in learning and developing grounding skills to help stop triggers of thoughts of using and prevent relapse. She expressed understanding the importance of developing grounding skills to be able to control triggers and remain in sobriety. She was given a handout booklet \"The Science of Addiction\"  To help her and her family to better understand addiction. She is working toward completing her treatment goals. Eulalia Cates was pleasant and cooperative during the group session. She shared the different areas that she had lived in and the contrast of each one. Emil Jasso was supported by staff in learning and developing grounding skills to help stop triggers of thoughts of using and prevent relapse. Theo Lennox

## 2021-02-09 NOTE — GROUP NOTE
[facility] GROUP DOCUMENTATION INDIVIDUAL Group Therapy Note Date: 2/9/2021 Group Start Time: 3834 Group End Time: 0900 Group Topic: [employer] SHF 3 DETOX Scot Miranda [facility] GROUP DOCUMENTATION GROUP Group Therapy Note [facility] GROUP DOCUMENTATION GROUP Group Therapy Note Topic: Community & Spirituality Staff supported patient with an overview of today's schedule, group rules, hygiene, smoke breaks, medication time, tidiness of the room and concerns. Attendees: 01 Attendance: Attended Patient's Goal:  ATTEND ALL GROUPS AND ACTIVITIES Interventions/techniques: Informed and Supported Follows Directions: Followed directions Interactions: Interacted appropriately Mental Status: Calm Behavior/appearance: Attentive, Cooperative and Neatly groomed Goals Achieved: Discussed discharge plans and Identified feelings Additional Notes: 
 
D:  The counselor discussed the daily schedule, reviewed program rules and regulations with the patient. The patient reported experiencing mild withdrawal symptoms restless legs and anxiety. The counselor inquired about history of mental health diagnosis and medication to assist with diagnosis, she replied, I have been diagnosed many times, I was given the meds but did not take them as prescribed. I felt I did not need them, because I felt fine.   The patient discharge plans have been completed by the , the counselor will schedule transportation on Thursday, February 11, 2021. A:  Despite the patients withdrawal symptoms she was able to focus during [employer]. It appears that the patient understands the rules of the program and displayed patience as she awaits time for a smoke break. P:  The counselor the counselor will schedule transportation on Thursday, February 11, 2021. Medical staff will continue to monitor patient withdrawal symptoms.   
 
 
JULIAN Avila, MA, MHR, [facility]

## 2021-02-09 NOTE — GROUP NOTE
[facility] GROUP DOCUMENTATION INDIVIDUAL Group Therapy Note Date: 2/9/2021 Group Start Time: 1100 Group End Time: 3197 Group Topic: Relapse Prevention/Role Play Group SHF 3 DETOX [facility] GROUP DOCUMENTATION GROUP Group Therapy Note Attendees:  36 
 
People, places and things playa vital role in encouraging or discouraging substance use, no matter the stage of addiction or recovery. The phrase people, places, and things refers to triggers. Triggers are the problematic cues that lead to a craving, which is a strongoften overwhelming desire to obtain and use your substance of abuse. The patient is progressing towards the treatment goals. Attendance: Attended Patient's Goal: Able to identify relapse triggers including interpersonal/social and familial factors Interventions/techniques: Informed Follows Directions: Followed directions Interactions: Disorganized interaction Mental Status: Anxious and Flat Behavior/appearance: Poor eye contact Goals Achieved: Identified triggers Additional Notes:  
 
D:  The patient actively participated during the group discussion [employer], [address] [facility], she identified the following as triggers; an old friend with addictive behaviors and [place] ([phone]) as high risk for a lapse. The patient stated, I allowed my addictive using friend to manipulate me into using again, I know when we are together, I am going to use.    She acknowledged that despite the negative consequences and a desire to terminate mood-altering drugs, once socializing with active users she has been unable to follow through without using. A:  It appears that patient is aware of her triggers, she identified her mother as her support system. P:  Continue to provide support and encourage her to utilize tools learned in Detox upon discharge. JULIAN Avila, MA, MHR, CSAC

## 2021-02-09 NOTE — BH NOTES
The counselor secured patient's transportation with [facility] for Thursday, February 11, 2021 at 10:00 am. The patient will continue treatment with  [facility], [phone]. The counselor documented the reference # [id number] on the calender in the nurses station.

## 2021-02-09 NOTE — ROUTINE PROCESS
Verbal shift change report given to Jessica Mejía RN (oncoming nurse) by Beverlie Buerger RN (offgoing nurse). Report included the following information Kardex, MAR and Recent Results.

## 2021-02-09 NOTE — BH NOTES
Chemical Dependency/Substance Abuse Therapist/Counselor Documentation    /THERAPIST PROGRESS NOTE    CURRENT STATUS OF PATIENT: Orientation (check one) [x] Person [x] Place [x] Time [x] Purpose    (Patient voiced concerns: Patient expressed feeling tired and wanting to sleep. Attitude/Behavior toward Examiner:   [x] Appropriate [x] Cooperative [] Aggressive [] Argumentative [] Angry [] Apathetic [] Passive  [] Childlike [] Interactive [] Guarded [] Demanding [] Dramatic [] Evasive [] Hostile [] Irritable [] Manipulative   [] Uncooperative [] Difficult to redirect [] Isolative-Withdrawal [] Sad-Tearful [] Suspicious [] Defensive    TREATMENT PLAN PROBLEM BEING ADDRESS[de-identified]  Medical Detox and Relapse Prevention  MODALITY:  [x] Individual Therapy  [x] Group Therapy  [] Family Therapy with Client Present  [] Family Therapy without Client Present  [] Multi-Family Group Therapy  [x] Spirituality Psychotherapy Group  [x] Psycho-Edutherapy Group    RISK OF HARM:    [x] None identified    [] Self injurious behavior  [] Threatening others without a plan   [] Actively Suicidal with plan  [] Threatening others with plan  [] Suicidal Thoughts without plan [] Weapon in the home  [] Means to complete a plan    Comments:  Participated in community and Spirituality Group, individual sessions, discharge planning, participated in morning groups and evening groups.       General Appearance: [x] Normal [] Disheveled [] Emaciated [] Obese [] Poor Hygiene    Dress: [x] Appropriate [] Eccentric [] Seductive [] Bizarre    Mood: [x] Euthymic/normal [] Dysphoric/unease [] Anxious [] Agitated [] Dysthymic [] Depressed [] Euphoric [] Pleasant  [] Bright [] Angry [] Manic    Affect: [x] Appropriate [] Inappropriate [] Labile [] Constricted [] Blunted [] Flat [] Lethargic [] Preoccupied    THOUGHT CONTENT:   [x] Remains focused on topic/thought control [] Unable to remain focused on topic [] Blocking [] Disorganized  [] Confused [] Preoccupied [] Flight of ideas [] Tangential [] Delusional thought content [] Persecutory  [] Bizarre [] Grandeur [] Guilt [] Somatic     PERCEPTION: [x] Normal [] Hallucinations [] Auditory [] Visual [] Tactile [] Olfactory/smell [] Gustatory/taste    Briefly summarize the specifics of the identified symptoms and/or behaviors listed and progress towards improvement and/or group interactions: Patient is scheduled to go to a 30 day program after detox is completed. MET (Motivational Enhancement Therapy): Based on discussions and interactions with the patient the patient falls within the following stage of treatment:   [] Pre-contemplation: Denial of illness with no need to change  [] Contemplation: Awareness of problem and considering change  [x] Determination: Willing to change and open to possible change  [] Action: Actively involved in recovery/treatment demonstrates a willing to make plans to change  [] Relapse Prevention: Able to verbalize plans to prevent relapse, 7 day plan    Briefly summarize the patient's interactions/discussions indicating the current or change in level of therapeutic treatment: Patient is expressing wanting to remain in recovery and wanting to learn skills needed to be able to do so. TREATMENT PROVIDED:  [] Developed/Reviewed Crisis Prevention Plan  [] Discussed/Reviewed Treatment Goals on Treatment Plan  [x] Discussed/Reviewed Discharge Planning   [] Discussed/Reviewed the patient goals of treatment  [] Refused/Unable to participate in discharge planning [] Other:    RECOMMENDATIONS: [] Change current therapeutic focus  [] Change treatment goals/objectives on the treatment plan     Continue current treatment plan. CONTINUED PLAN OF CARE: - Continue with current Plan of Care.   DISCHARGE PLANNING:   [x] Has identified a family support system   [] Patient has not identified a family support system  [x] Has connected with sober/clean support system  [] Patient has not connected with sober/clean support system  [] Patient uses special equipment at home and equipment is in the home  [] Has identified community support    [] Patient has not been able to identify community support  Barriers to Discharge:  [] Difficulty returning to present living arrangement  [] Will require assistance with placement post discharge  [] There is no local substance abuse disorder programs available to the patient  [] Needs referral to     Discharge Planning Comments: Peterson Schilder will attend a 30- 45 day program after she has completed the medical detox. .  She expresses that's she is hopeful that she will be successful in remaining in  Sobriety.

## 2021-02-09 NOTE — GROUP NOTE
[facility] GROUP DOCUMENTATION INDIVIDUAL Group Therapy Note Date: 2/9/2021 Group Start Time: 1000 Group End Time: 3636 Group Topic: Relapse Prevention/Role Play Group SHF 3 DETOX [facility] GROUP DOCUMENTATION GROUP Group Therapy Note REVIEWED:  Stages of Changes. There are five main stages in the Stages of Change: Precontemplation, contemplation, preparation, action and maintenance. Relapse can occur at or between stages. These stages can be represented as a cycle, and in theory, people should go through these stages in sequence. In reality, people can jump about between stages, go backward and forward, and even be in more than one stage at a time. Attendees: 01 Attendance: Attended Patient's Goal: Able to identify relapse triggers including interpersonal/social and familial factors Interventions/techniques: Informed Follows Directions: Followed directions Interactions: Disorganized interaction Mental Status: Anxious and Preoccupied Behavior/appearance: Withdrawn/quiet Goals Achieved: Identified triggers Additional Notes:  
 
D:  The patient participated during the group discussion only after being prompted. She continued to report experiencing withdrawal symptoms (cold sweats and anxiousness). The patient identified being in the Preparation stage of change, she stated, Jasmeet made a decision to do better, not using illicit drugs and because I admitted myself into detox.  The medical staff was notified of the patients compliant and she will be monitored by medical staff. A:  It appears the patient have made the decision to change behaviors and have thought about how to do so. However, the patient is currently going through medical detox due to her illicit drug use. P:  Continue to provide support to the patient.    
 
JULIAN Avila, MA, MHR, CSAC

## 2021-02-09 NOTE — PROGRESS NOTES
Patient reports restlessness and feeling anxious, wanted medication for it. Vistaril 50mg po was given. Will continue to monitor.

## 2021-02-09 NOTE — PROGRESS NOTES
Progress note 2/9/2021  Subjectively the patient is a 60-year-old [facility] female seen for progress of opiate detoxification. Vomiting yesterday she is on medicine she tells me with a smile on her face she did not had any during the night. She had not been eating extremely well apparently they set up a 30-day program for her. Objectively blood pressure 117/69 pulse 74 respirations 16 her cows score is 9 the lungs are clear the abdomen is soft she is a little anxious today. No sweatiness is noted the abdomen is unremarkable she is pleasant and cooperative in no significant distress. Assessment acute opioid withdrawal, chronic opioid dependence, chronic tobacco dependence  Plan: She is medically stable at this time she is continuing synthetic Suboxone.   Should be followed up on a daily basis and we are making the decision for further to a long-term program.

## 2021-02-09 NOTE — GROUP NOTE
[facility] GROUP DOCUMENTATION INDIVIDUAL Group Therapy Note Date: 2/9/2021 Group Start Time: 2851 Group End Time: 1800 Group Topic: Education Group - Inpatient Topic:  Basic Hygiene SHF 3 DETOX Jackie Saul [facility] GROUP DOCUMENTATION GROUP Group Therapy Note Attendees: 2 Attendance: Attended Patient's Goal: Attends activities and groups, Verbalizes abstinence as an achievable goal, Will identify negative impact of chemical dependency including the use of tobacco, alcohol, and other substances Interventions/techniques: Informed, Provide feedback, Reinforced and Supported Follows Directions: Followed directions Interactions: Interacted appropriately Mental Status: Calm and Happy Behavior/appearance: Attentive, Cooperative and Neatly groomed Goals Achieved: Able to engage in interactions, Able to listen to others, Able to give feedback to another, Able to reflect/comment on own behavior, Able to receive feedback, Able to self-disclose, Discussed coping and Discussed discharge plans Additional Notes: Aydee Wheeler was supported in the review of basic hygiene. The importance of washing hands, bathing, brushing teeth, getting proper sleep, and eating a balanced diet as to maintaining sobriety was discussed. Eulalia Cates was pleasant and cooperative in her actions. She actively participated in the discussion. Candelaria Beckett is progressing toward completion of her  treatment goals. P-  Staff will continue to support Candelaria Sophy in working toward the completion of her treatment goals. Theo Lennox

## 2021-02-09 NOTE — GROUP NOTE
[facility] GROUP DOCUMENTATION INDIVIDUAL Group Therapy Note Date: 2/8/2021 Group Start Time: 1800 Group End Time: 2201 Group Topic: AA/NA 
 
SHF 3 DETOX Vaishnavi Olmstead [facility] GROUP DOCUMENTATION GROUP Group Therapy Note Attendees: 2 Attendance: Attended Patient's Goal: Attends activities and groups, Will identify negative impact of chemical dependency including the use of tobacco, alcohol, and other substances, Verbalizes abstinence as an achievable goal 
 
Interventions/techniques: Informed, Reinforced and Supported Follows Directions: Followed directions Interactions: Interacted appropriately Mental Status: Calm Behavior/appearance: Attentive, Cooperative and Excessive makeup Goals Achieved: Able to engage in interactions, Able to listen to others, Able to give feedback to another, Able to reflect/comment on own behavior, Able to receive feedback, Able to self-disclose, Discussed coping and Discussed discharge plans Additional Notes: 
D- Symone Matute  was supported in reviewing and understanding the 12 Steps Program.  She shared  That she is currently working on the [employer] and Is on step 3. She shared some of her personal story and what future treatment she will be going to (30 day program) to help her remain in recovery. The important support that AA/NA can give was reviewed. Symone Matute is working toward the completion of her  treatment goals. Sinan De Leon was pleasant and cooperative during group. P- Staff is [place] with working toward the completion of her  treatment goals. Julian Mckeon

## 2021-02-10 PROCEDURE — 99231 SBSQ HOSP IP/OBS SF/LOW 25: CPT | Performed by: FAMILY MEDICINE

## 2021-02-10 PROCEDURE — 74011250637 HC RX REV CODE- 250/637: Performed by: FAMILY MEDICINE

## 2021-02-10 PROCEDURE — 65310000001 HC RM PRIVATE DETOX

## 2021-02-10 PROCEDURE — 74011636637 HC RX REV CODE- 636/637: Performed by: FAMILY MEDICINE

## 2021-02-10 RX ADMIN — HYDROXYZINE PAMOATE 50 MG: 25 CAPSULE ORAL at 22:36

## 2021-02-10 RX ADMIN — BUPRENORPHINE 4 MG: 2 TABLET SUBLINGUAL at 07:59

## 2021-02-10 RX ADMIN — BUPRENORPHINE 2 MG: 2 TABLET SUBLINGUAL at 21:58

## 2021-02-10 NOTE — PROGRESS NOTES
Problem: Opiate Withdrawal  Goal: *STG: Maintains appropriate nutrition and hydration  Outcome: Progressing Towards Goal   Patient admits to eating poorly states lack of appetite. Mood is more stable. States she has some highs and lows but admits more up than down smiling. Will continue with modified taper. Patient engaged with [employer] denies any SI at this time. Will continue to monitor.

## 2021-02-10 NOTE — GROUP NOTE
[facility] GROUP DOCUMENTATION INDIVIDUAL Group Therapy Note Date: 2/10/2021 Group Start Time: 1000 Group End Time: 0255 Group Topic: Relapse Prevention/Role Play Group SHF 3 DETOX [facility] GROUP DOCUMENTATION GROUP Group Therapy Note Topics: Post -Acute Withdrwarl Syndrome (PAWS) REVIEWED:  Post-Acute Withdrwarl Syndrome (PAWS). Understanding of the definition of PAWS. Who does PAWS affect? When does it start and how long does it last? What are the symptoms of PAWS? What can I do about PAWS? Attendees: 01 Attendance: Attended Patient's Goal:  Able to identify relapse triggers including interpersonal/social and familial factors Interventions/techniques: Informed and Supported Follows Directions: Followed directions Interactions: Interacted appropriately Mental Status: Calm Behavior/appearance: Attentive, Cooperative, Motivated and Neatly groomed Goals Achieved: Discussed coping and Identified triggers Additional Notes:   
 
D: The patient actively participated during the group discussion, she denied ever experiencing symptoms of PAWS although she reports 2 years of sobriety previously and was employed as a Peer Specialist. She stated, I dont recall having symptoms of PAWS. However, she later described difficulty dealing with stress which is a symptom of PAWS. The counselor reinforced that anyone in active addiction will experience some symptoms of PAWS once they are no longer abusing drugs or alcohol for a period. The counselor provided ideas that may assist the patient in dealing with PAWS in the future by identifying a support system and incorporating a daily routine that allows time to rest and relax. The counselor suggested that the patient attends local  meeting for continued support. A:  The patient appeared open to the coping skills that the counselor provided, she listed other coping skills to combat symptoms of PAWS. The patient was provided positive feedback regarding her increased understanding of PAWS as it relates to maintaining her sobriety. P:  Continue to provide support to the patient.   
 
JULIAN Avila MA, MHR, [facility]

## 2021-02-10 NOTE — GROUP NOTE
IP  GROUP DOCUMENTATION INDIVIDUAL Group Therapy Note Date: 2/10/2021 Group Start Time: 1112 Group End Time: 0900 Group Topic: [employer] SHF 3 DETOX Group Therapy Note [facility] GROUP DOCUMENTATION GROUP Topic: Community & Spirituality Staff supported patient with an overview of today's schedule, group rules, hygiene, smoke breaks, medication time, tidiness of the room and concerns. Attendees: 01 Attendance: Attended Patient's Goal:  ATTENDS GROUPS AND ACTIVITIES Interventions/techniques: Informed and Supported Follows Directions: Followed directions Interactions: Interacted appropriately Mental Status: Calm Behavior/appearance: Attentive, Cooperative and Neatly groomed Goals Achieved: Discussed discharge plans Additional Notes: 
 
D:  The counselor discussed the daily schedule, reviewed program rules and regulations with the patient. The patient denied experiencing withdrawal symptoms. The counselor and patient discussed discharge plans; the patient will enroll in [address] [place] [address] at [facility] residential treatment. Transportation is secured for Thursday, February 11, 2021 at 10:00 am with [facility] ([facility] #[id number]) and documented on the calendar at the nurses station. The patient stated, my uncle (3 years in recovery) and my mother shared that they are willing to provide me with support.  A: The patient displayed no physical withdrawal symptoms and appeared to be at her psychological baseline. The patient agrees about continuing treatment at a residential facility. P:  The medical staff will continue to monitor and assess the patients withdrawal symptoms.     
 
JULIAN Avila, MA, MHR, CSAC

## 2021-02-10 NOTE — PROGRESS NOTES
Heaven Clifford has rested well tonight, medications given were effective. Denies any pain or SI this moring. Mood is appropriate and express some anxiety about transfer to residential facility. Reassurance  Offered. Plan for the day is to continue with relapse prevention planing and 12 step process.

## 2021-02-10 NOTE — GROUP NOTE
IP  GROUP DOCUMENTATION INDIVIDUAL Group Therapy Note Date: 2/10/2021 Group Start Time: 0890 Group End Time: 3690 Group Topic: Nutrition SHF 3 DETOX [facility] GROUP DOCUMENTATION GROUP Group Therapy Note Attendees: 2 Attendance: Attended Patient's Goal: Attends activities and groups, Will identify negative impact of chemical dependency including the use of tobacco, alcohol, and other substances, Maintains appropriate nutrition and hydration Interventions/techniques: Challenged, Informed, Validated, Promoted peer support, Provide feedback, Reinforced and Supported Follows Directions: Followed directions Interactions: Interacted appropriately Mental Status: Calm and Restricted Behavior/appearance: Attentive, Cooperative and Motivated Goals Achieved: Able to engage in interactions, Able to listen to others, Able to reflect/comment on own behavior, Able to receive feedback, Able to experience relief/decrease in symptoms, Able to self-disclose, Discussed coping and Discussed discharge plans Additional Notes:   
Yuko Orantes was supported in gaining an understanding of nutrition and how important it is for recovery of addiction. The five main food groups were reviewed and discussed. The importance of eating 4 meals and a snack each day, exercising, and getting the right amount of sleep was discussed. It was discussed that hungry and fatigue can mimic cravings and possibly lead to a relapse. Amie Mills is working toward  Completion of her treatment goals. Alice Finch actively participated in the group. She agreed to go back and eat more of her dinner to provide her body with the nutrition that it needs for recovery. P-Staff will support Junior in gaining an understanding of the importance of good nutritional habits toward helping him  To remain without relapse. Angelica Michael

## 2021-02-10 NOTE — PROGRESS NOTES
Dr. Davila John in for daily assessment. Plan for the day is to continue with relapse prevention planing and 12 step process. Discharge plan for tomorrow for residential facility.

## 2021-02-10 NOTE — GROUP NOTE
[facility] GROUP DOCUMENTATION INDIVIDUAL Group Therapy Note Date: 2/9/2021 Group Start Time: 1800 Group End Time: 1900 Group Topic: AA/NA And Craft Activity SHF 3 DETOX Marilee Hazel [facility] GROUP DOCUMENTATION GROUP Group Therapy Note Attendees: 2 Attendance: Attended Patient's Goal:  Attends activities and groups, Will identify negative impact of chemical dependency including the use of tobacco, alcohol, and other substances,  Verbalizes abstinence as an achievable goal. 
 
Interventions/techniques: Challenged, Informed, Promoted peer support, Provide feedback, Reinforced and Supported Follows Directions: Followed directions Interactions: Interacted appropriately Mental Status: Calm Behavior/appearance: Attentive and Cooperative Goals Achieved: Able to engage in interactions, Able to listen to others, Able to reflect/comment on own behavior, Able to receive feedback, Able to self-disclose and Identified triggers Additional Notes:  
Kevyn Burt  was supported in reviewing and understanding the 12 Steps Program.  She shared some of her personal story as to her addiction in the  past and what she wants in the future. She reports she was working the [employer] recently in [employer]. The important support that AA/NA can give was reviewed. Misti Shaffer is working toward the completion of her  treatment goals. Susie Meraz was pleasant and cooperative during group. P- Staff is Guy Caballero with working toward the completion of her treatment goals. Misti Shaffer actively  participated in group activity of making memory wire bracelets after the AA meeting was completed. Radha Adler

## 2021-02-10 NOTE — GROUP NOTE
[facility] GROUP DOCUMENTATION INDIVIDUAL Group Therapy Note Date: 2/10/2021 Group Start Time: 1300 Group End Time: 8615 Group Topic: Relapse Prevention/Role Play Group SHF 3 DETOX [facility] GROUP DOCUMENTATION GROUP Topic: Triggers REVIEWED:  Triggers: People, Places, things, thoughts and activities which could be potential triggers. Attendance: Attended Patient's Goal:  Able to identify relapse triggers including interpersonal/social and familial factors Interventions/techniques: Informed and Supported Follows Directions: Followed directions Interactions: Interacted appropriately Mental Status: Calm and Happy Behavior/appearance: Attentive, Cooperative, Motivated and Neatly groomed Goals Achieved: Discussed coping and Identified triggers Additional Notes:  
 
D:  The patient was an active participant during the group discussion on Triggers. The patient was taught about high risk situations e.g., negative emotions, social pressure, interpersonal conflict. She identified many friends and family as her triggers who engages in addictive behaviors. The counselor assisted the patient in developing a plan to help the patient say no to those family and friends that engages in addictive behaviors. A:  The patient appeared open in learning coping strategies in saying not to illicit drugs. He participated in the refusal of substances during high risk situations. P:  Continue to provide support to the patient. Urge the patient to find ways to build new strategies into her life as possible.  
 
JULIAN Avila, MA, MHR, CSAC

## 2021-02-10 NOTE — ROUTINE PROCESS
Verbal shift change report given to Navid Smith (oncoming nurse) by Hrebie Mcclain RN (offgoing nurse). Report included the following information Kardex, MAR and Med Rec Status.

## 2021-02-10 NOTE — BH NOTES
Chemical Dependency/Substance Abuse Therapist/Counselor Documentation    /THERAPIST PROGRESS NOTE    CURRENT STATUS OF PATIENT: Orientation (check one) [x] Person [x] Place [x] Time [x] Purpose    (Patient voiced concerns:  Patient voiced concerns that she may lose her car and her job due to going into long term treatment after detox is completed. Attitude/Behavior toward Examiner:   [x] Appropriate [x] Cooperative [] Aggressive [] Argumentative [] Angry [] Apathetic [] Passive  [] Childlike [] Interactive [] Guarded [] Demanding [] Dramatic [] Evasive [] Hostile [] Irritable [] Manipulative   [] Uncooperative [] Difficult to redirect [] Isolative-Withdrawal [] Sad-Tearful [] Suspicious [] Defensive    TREATMENT PLAN PROBLEM BEING ADDRESS: Medical Detox and Relapse Prevention    MODALITY:  [x] Individual Therapy  [x] Group Therapy  [] Family Therapy with Client Present  [] Family Therapy without Client Present  [] Multi-Family Group Therapy  [x] Spirituality Psychotherapy Group  [] Psycho-Edutherapy Group    RISK OF HARM:    [x] None identified    [] Self injurious behavior  [] Threatening others without a plan   [] Actively Suicidal with plan  [] Threatening others with plan  [] Suicidal Thoughts without plan [] Weapon in the home  [] Means to complete a plan    Comments:Participated in community and Spirituality Group, individual sessions, discharge planning, participated in morning groups and evening groups.     General Appearance: [x] Normal [] Disheveled [] Emaciated [] Obese [] Poor Hygiene    Dress: [x] Appropriate [] Eccentric [] Seductive [] Bizarre    Mood: [x] Euthymic/normal [] Dysphoric/unease [] Anxious [] Agitated [] Dysthymic [] Depressed [] Euphoric [] Pleasant  [] Bright [] Angry [] Manic    Affect: [] Appropriate [] Inappropriate [] Labile [] Constricted [] Blunted [] Flat [] Lethargic [] Preoccupied    THOUGHT CONTENT:   [x] Remains focused on topic/thought control [] Unable to remain focused on topic [] Blocking [] Disorganized  [] Confused [] Preoccupied [] Flight of ideas [] Tangential [] Delusional thought content [] Persecutory  [] Bizarre [] Grandeur [] Guilt [] Somatic     PERCEPTION: [x] Normal [] Hallucinations [] Auditory [] Visual [] Tactile [] Olfactory/smell [] Gustatory/taste    Briefly summarize the specifics of the identified symptoms and/or behaviors listed and progress towards improvement and/or group interactions: Kenneth Balbuena has been distant and needing prompts at the beginning of her treatment. She is now actively participating and being more open and social.      MET (Motivational Enhancement Therapy): Based on discussions and interactions with the patient the patient falls within the following stage of treatment:   [] Pre-contemplation: Denial of illness with no need to change  [] Contemplation: Awareness of problem and considering change  [x] Determination: Willing to change and open to possible change  [] Action: Actively involved in recovery/treatment demonstrates a willing to make plans to change  [] Relapse Prevention: Able to verbalize plans to prevent relapse, 7 day plan    Briefly summarize the patient's interactions/discussions indicating the current or change in level of therapeutic treatment: Kenneth Balbuena is now focused on recovery and what she needs to do to be successful. TREATMENT PROVIDED:  [] Developed/Reviewed Crisis Prevention Plan  [] Discussed/Reviewed Treatment Goals on Treatment Plan  [] Discussed/Reviewed Discharge Planning   [] Discussed/Reviewed the patient goals of treatment  [] Refused/Unable to participate in discharge planning [] Other:    RECOMMENDATIONS: [] Change current therapeutic focus  [] Change treatment goals/objectives on the treatment plan  - No treatment changes recommended. CONTINUED PLAN OF CARE: - Continue current Plan of Care.       DISCHARGE PLANNING:   [] Has identified a family support system   [] Patient has not identified a family support system  [x] Has connected with sober/clean support system  [] Patient has not connected with sober/clean support system  [] Patient uses special equipment at home and equipment is in the home  [] Has identified community support    [] Patient has not been able to identify community support  Barriers to Discharge:  [] Difficulty returning to present living arrangement  [] Will require assistance with placement post discharge  [] There is no local substance abuse disorder programs available to the patient  [] Needs referral to     Discharge Planning Comments:   Wander Verdin will be completing medical detox on February 11,2021. She will be transported directly to   [facility] for further treatment .

## 2021-02-10 NOTE — PROGRESS NOTES
Progress note 2/10/2021  Subjectively the patient is seen today for follow-up of opiate detoxification. She tells us that she did not need much last night and she did not sleep much at all although the nurses did not report either of those. She is scheduled for discharge tomorrow to a 28-day program.  She has been a little reserved. She has allow the nurses to talk to her parents but she has not talked to them at all. She has had no falls or injuries. No other complaints. Objectively blood pressure 108/59 pulse 74 respiration 17 temperature 98.7 pulse ox 96% the lungs seem to be clear the abdomen is soft her affect is a little flat there is no rash there is no edema. Her cows score is 5  Assessment acute opiate withdrawal, chronic opiate dependence, chronic tobacco dependence, psychological illness of undetermined etiology questionable PTSD questionable bipolar disease questionable major depression, substance use (drug screen positive for PCP). Plan patient has been weaned appropriately she will probably be discharged in the morning for her 30-day program.  She is medically stable at this time.

## 2021-02-11 VITALS
WEIGHT: 264 LBS | SYSTOLIC BLOOD PRESSURE: 125 MMHG | OXYGEN SATURATION: 98 % | RESPIRATION RATE: 17 BRPM | HEART RATE: 74 BPM | TEMPERATURE: 98 F | DIASTOLIC BLOOD PRESSURE: 77 MMHG | BODY MASS INDEX: 45.07 KG/M2 | HEIGHT: 64 IN

## 2021-02-11 PROCEDURE — 99238 HOSP IP/OBS DSCHRG MGMT 30/<: CPT | Performed by: FAMILY MEDICINE

## 2021-02-11 PROCEDURE — 74011250637 HC RX REV CODE- 250/637: Performed by: FAMILY MEDICINE

## 2021-02-11 PROCEDURE — 74011636637 HC RX REV CODE- 636/637: Performed by: FAMILY MEDICINE

## 2021-02-11 RX ADMIN — BUPRENORPHINE 2 MG: 2 TABLET SUBLINGUAL at 07:15

## 2021-02-11 RX ADMIN — Medication 9 MG: at 01:03

## 2021-02-11 NOTE — DISCHARGE INSTRUCTIONS
Patient Education        Learning About Opioid Use Disorder  What is opioid use disorder? Opioid use disorder means that a person uses opioids even though it causes harm to themselves or others. It can range from mild to severe. The more signs of this disorder you have, the more severe it may be. Moderate to severe opioid use disorder is sometimes called addiction. People who have it may find it hard to control their use. This disorder can develop from the use of any type of opioid. Prescription ones include hydrocodone, oxycodone, fentanyl, and morphine. Heroin is an example of an illegal opioid. Opioids can be dangerous. Taking too much can cause:  · Trouble breathing. · Low blood pressure. · A low heart rate. · A coma. · Death. Many people with this disorder, and sometimes their families, feel embarrassed or ashamed. Don't let these feelings  the way of getting treatment. Remember that this disorder can happen to anyone who uses opioids, no matter what the reason. What are the signs? You may have opioid use disorder if two or more of the following are true. The more signs of this disorder you have, the more severe it may be. · You use larger amounts of opioids than you ever meant to. Or you've been using them for a longer period of time than you ever meant to. · You can't cut down or control your use. Or you constantly wish you could cut down. · You spend a lot of time getting or using opioids or recovering from the effects. · You have strong cravings for opioids. · You can no longer do your main jobs at work, at school, or at home. · You keep using, even though your opioid use hurts your relationships. · You have stopped doing important activities because of your opioid use. · You use opioids in situations where doing so is dangerous. · You keep using, even though you know it is causing health problems.   · You need more and more of the opioids to get the same effect, or you get less effect from the same amount over time. This is called tolerance. · You have uncomfortable symptoms when you stop using opioids or use less. This is called withdrawal.  If you're taking opioids as part of a supervised care plan, tolerance and withdrawal may not mean that you have opioid use disorder. How is opioid use disorder treated? Treatment usually includes medicines, group therapy, one or more types of counseling, and education. Medicines may be used to help you quit. They may help to control cravings, ease withdrawal symptoms, and prevent relapse. This treatment is called medication-assisted treatment, or MAT. During MAT, you take a medicine (usually methadone or buprenorphine) in place of the opioid you were using. Most people take the medicine for months or years as a part of the treatment, along with therapy or counseling. Treatment focuses on more than opioid use. It helps you cope with the anger, frustration, sadness, and disappointment that often happen when a person tries to stop using opioids. Treatment also looks at other parts of your life, like your relationships with friends and family, school and work, medical problems, and living situation. Treatment helps you find and manage problems. It helps you take control of your life so you don't have to depend on opioids. Opioid use disorder affects your whole family. Family counseling often is part of treatment. Urgent treatment for an overdose  Naloxone is a medicine that reverses the effects of an opioid overdose. If you take it or someone gives it to you soon enough after an overdose, it can save your life. Naloxone comes in a rescue kit you can carry with you. Ask your doctor or pharmacist about having a naloxone rescue kit on hand. Follow-up care is a key part of your treatment and safety. Be sure to make and go to all appointments, and call your doctor if you are having problems.  It's also a good idea to know your test results and keep a list of the medicines you take. Where can you learn more? Go to http://www.gray.com/  Enter X744 in the search box to learn more about \"Learning About Opioid Use Disorder. \"  Current as of: June 29, 2020               Content Version: 12.6  © 6626-6599 [employer]. Care instructions adapted under license by [employer] (which disclaims liability or warranty for this information). If you have questions about a medical condition or this instruction, always ask your healthcare professional. [place] [address] any warranty or liability for your use of this information. Patient Education        Opioid Overdose: Care Instructions  Your Care Instructions     You have had treatment to help your body recover from taking too much of an opioid. You are getting better, but you may not feel well for a while. It takes time for the opioids to leave your body. How long it takes to feel better depends on which drug you took and how much you took of it. Opioids include illegal drugs such as heroin, often called smack, junk, H, and ska. Opioids also include medicines that doctors prescribe to treat pain. These are medicines such as oxycodone, methadone, and buprenorphine. They are sometimes sold and used illegally. Taking too much of an opioid can be dangerous. It may cause:  · Trouble breathing. · Low blood pressure. · A low heart rate. · A coma. When the doctor treated you for the overdose, he or she may have:  · Watched your symptoms or done tests to find out what kind of drug you took. · Given you fluids. · Given you oxygen to help you breathe. · Given you a medicine called naloxone to help reverse the effects of the opioid. · Done several tests, including blood tests, to see how you're responding to treatment. The doctor also watched you carefully to make sure you were recovering safely.   Follow-up care is a key part of your treatment and safety. Be sure to make and go to all appointments, and call your doctor if you are having problems. It's also a good idea to know your test results and keep a list of the medicines you take. How can you care for yourself at home? · If you take opioids regularly, your body gets used to them. This is called physical dependence. If you are physically dependent on opioids, you may have withdrawal symptoms when you stop using them or use less. These symptoms can include nausea, sweating, chills, diarrhea, stomach cramps, and muscle aches. Withdrawal can last up to several weeks, depending on which opioid you took and how long you took it. You may feel very ill, but you probably aren't in medical danger. · Your doctor may give you medicine to help you feel better. To help you get through withdrawal, you can also:  ? Get plenty of rest.  ? Drink plenty of fluids. ? Stay active. ? Eat a healthy diet. · If you had a tube in your throat to help you breathe, you may have a sore throat or hoarseness that can last a few days. Sip liquids to help soothe your throat. · Do not drink alcohol or take illegal drugs. · Do not take medicines that make you tired, like sleeping pills or muscle relaxers. · Do not drive if you feel sleepy or groggy while you recover from an overdose. · Get help to stop using opioids. Talk to your doctor about substance use treatment programs. · Talk to your doctor or pharmacist about having a naloxone rescue kit on hand. When should you call for help? Call 911 anytime you think you may need emergency care. For example, call if:    · You feel you cannot stop from hurting yourself or someone else. Call your doctor now or seek immediate medical care if:    · You have new or worse withdrawal symptoms, such as:  ? Stomach cramps. ? Vomiting. ? Diarrhea. ? Muscle aches. ? Sweating.    Watch closely for changes in your health, and be sure to contact your doctor if:    · You do not get better as expected. Where can you learn more? Go to http://www.gray.com/  Enter Z171 in the search box to learn more about \"Opioid Overdose: Care Instructions. \"  Current as of: June 29, 2020               Content Version: 12.6  © 1518-7675 [employer]. Care instructions adapted under license by [employer] (which disclaims liability or warranty for this information). If you have questions about a medical condition or this instruction, always ask your healthcare professional. [place] [address] any warranty or liability for your use of this information.

## 2021-02-11 NOTE — GROUP NOTE
[facility] GROUP DOCUMENTATION INDIVIDUAL Group Therapy Note Date: 2/10/2021 Group Start Time: 7493 Group End Time: 1930 Group Topic: AA/NA 
 
SHF 3 DETOX Fred Skinner [facility] GROUP DOCUMENTATION GROUP Group Therapy Note Attendees: 3 Attendance: Attended Patient's Goal: Attends activities and groups, Will identify negative impact of chemical dependency including the use of tobacco, alcohol, and other substances,  Verbalizes abstinence as an achievable goal 
 
Interventions/techniques: Challenged, Informed, Validated, Promoted peer support, Provide feedback, Reinforced and Supported Follows Directions: Followed directions Interactions: Interacted appropriately Mental Status: Calm Behavior/appearance: Attentive and Cooperative Goals Achieved: Able to engage in interactions, Able to listen to others, Able to reflect/comment on own behavior, Able to manage/cope with feelings, Able to receive feedback, Able to experience relief/decrease in symptoms, Able to self-disclose, Discussed coping and Discussed discharge plans Additional Notes:  
Dean Saul was supported in reviewing and understanding the 12 Steps Program. She shared some of her current goals as to what she wants in the future for herself. The important support that AA/NA can give was reviewed. She reported that she has worked the steps in the past in an [address] [place] [address]. Symone Matute is working toward the completion of her  treatment goals. Sinan De Leon was pleasant and cooperative during group. P- Staff is [place] with working toward the completion of her treatment goals. Julian Mckeon

## 2021-02-11 NOTE — PROGRESS NOTES
Problem: Opiate Withdrawal  Goal: *STG: Participates in treatment plan  Outcome: Progressing Towards Goal  Goal: *STG: Remains safe in hospital  Outcome: Progressing Towards Goal  Goal: *STG: Seeks staff when symptoms of withdrawal increase  Outcome: Progressing Towards Goal  Goal: *STG: Complies with medication therapy  Outcome: Progressing Towards Goal  Goal: *STG: Attends activities and groups  Outcome: Progressing Towards Goal  Goal: *STG: Will identify negative impact of chemical dependency including the use of tobacco, alcohol, and other substances  Outcome: Progressing Towards Goal  Goal: *STG: Verbalizes abstinence as an achievable goal  Outcome: Progressing Towards Goal  Goal: *STG: Agrees to participate in outpatient after care program to support ongoing mental health  Outcome: Progressing Towards Goal   Pt appears to be progressing well. Actively participating in recovery efforts.

## 2021-02-11 NOTE — DISCHARGE SUMMARY
Discharge note 2021  Admission diagnoses acute opioid withdrawal, chronic opioid dependence, chronic tobacco dependence, substance use (positive PCP), chronic psychiatric illness not completely diagnosed  Discharge diagnosis same  History of present illness: The patient is a 32-year-old -American female  1 para 1 abortus 1 that is se§ with a 3year-old child she is here to detox from opiates she has been taking methadone 90 mg a day and insists that she has been in a methadone clinic where she was taking 75 mg and decided to decrease herself. She has been supplementing with every other day or so fentanyl she is not sure exactly how much. He comes into our facility with runny nose and congestion. She has had no fever or chills. She denies being suicidal or homicidal.  She had no vomiting or diarrhea or rashes. She has been eating relatively well. On social history positive for tobacco positive for substances positive for opiates  Past medical history she is status post a  but otherwise unremarkable. Review of systems HEENT exam no runny nose  Musculoskeletal no discomfort  Lungs no cough or cold  GI no nausea or vomiting at this point but she had some throughout her stay   no urinary tract symptomatology  Skin no rashes  Physical exam showed a blood pressure of 118/80 her pulse is 88 respirations 18 in general she is an obese female she does not seem to be in any acute distress she has some chronic flatness to her demeanor. She talks intermittently she has been eating better. She has no edema. She has no rashes. Lab her lab work was stable she was positive for methadone positive for opiates and positive for PCP  Hospital course the patient has been relatively stable she had some issues with focus during the stay some other issues it may does question whether she could be bipolar there was a questionable history of depression bipolar or PTSD.   She was a very poor historian about her medical and psychological issues. She came on no medications and she will leave on no medications for them but we think they need to be addressed by appropriate psychiatry or psychology. She is medically stable at the time of her discharge. Is a 30-day program that she will be leaving to today.

## 2021-02-11 NOTE — BH NOTES
Chemical Dependency/Substance Abuse Therapist/Counselor Documentation    /THERAPIST PROGRESS NOTE    CURRENT STATUS OF PATIENT: Orientation (check one) [x] Person [x] Place [x] Time [x] Purpose     (Patient voiced concerns:  No Concerns were voiced by patient). Attitude/Behavior toward Examiner:   [x] Appropriate [] Cooperative [] Aggressive [] Argumentative [] Angry [] Apathetic [] Passive  [] Childlike [] Interactive [] Guarded [] Demanding [] Dramatic [] Evasive [] Hostile [] Irritable [] Manipulative   [] Uncooperative [] Difficult to redirect [] Isolative-Withdrawal [] Sad-Tearful [] Suspicious [] Defensive    TREATMENT PLAN PROBLEM BEING ADDRESS: Medical Detox and Relapse Prevention  MODALITY:  [x] Individual Therapy  [x] Group Therapy  [] Family Therapy with Client Present  [] Family Therapy without Client Present  [] Multi-Family Group Therapy  [x] Spirituality Psychotherapy Group  [x] Psycho-Edutherapy Group    RISK OF HARM:    [x] None identified    [] Self injurious behavior  [] Threatening others without a plan   [] Actively Suicidal with plan  [] Threatening others with plan  [] Suicidal Thoughts without plan [] Weapon in the home  [] Means to complete a plan    Comments: Participated in community and Spirituality Group, individual sessions, discharge planning, participated in morning groups and evening groups.     General Appearance: [x] Normal [] Disheveled [] Emaciated [] Obese [] Poor Hygiene    Dress: [x] Appropriate [] Eccentric [] Seductive [] Bizarre    Mood: [x] Euthymic/normal [] Dysphoric/unease [] Anxious [] Agitated [] Dysthymic [] Depressed [] Euphoric [] Pleasant  [] Bright [] Angry [] Manic    Affect: [x] Appropriate [] Inappropriate [] Labile [] Constricted [] Blunted [] Flat [] Lethargic [] Preoccupied    THOUGHT CONTENT:   [x] Remains focused on topic/thought control [] Unable to remain focused on topic [] Blocking [] Disorganized  [] Confused [] Preoccupied [] Flight of ideas [] Tangential [] Delusional thought content [] Persecutory  [] Bizarre [] Grandeur [] Guilt [] Somatic     PERCEPTION: [x] Normal [] Hallucinations [] Auditory [] Visual [] Tactile [] Olfactory/smell [] Gustatory/taste    Briefly summarize the specifics of the identified symptoms and/or behaviors listed and progress towards improvement and/or group interactions: Patient is a poor eater. She was encouraged to eat a little of each of her foods on her plate and she did try to each some more of her dinner. MET (Motivational Enhancement Therapy): Based on discussions and interactions with the patient the patient falls within the following stage of treatment:   [] Pre-contemplation: Denial of illness with no need to change  [] Contemplation: Awareness of problem and considering change  [x] Determination: Willing to change and open to possible change  [] Action: Actively involved in recovery/treatment demonstrates a willing to make plans to change  [] Relapse Prevention: Able to verbalize plans to prevent relapse, 7 day plan    Briefly summarize the patient's interactions/discussions indicating the current or change in level of therapeutic treatment:  Patient is actively stating that she Is seeking remain without relapse. TREATMENT PROVIDED:  [] Developed/Reviewed Crisis Prevention Plan  [] Discussed/Reviewed Treatment Goals on Treatment Plan  [x] Discussed/Reviewed Discharge Planning   [] Discussed/Reviewed the patient goals of treatment  [] Refused/Unable to participate in discharge planning [] Other:     RECOMMENDATIONS: [] Change current therapeutic focus  [] Change treatment goals/objectives on the treatment plan - No Change recommended to treatment for patient. CONTINUED PLAN OF CARE: - Continue Current Plan of Care.     DISCHARGE PLANNING:   [x] Has identified a family support system   [] Patient has not identified a family support system  [x] Has connected with sober/clean support system  [] Patient has not connected with sober/clean support system  [] Patient uses special equipment at home and equipment is in the home  [] Has identified community support    [] Patient has not been able to identify community support  Barriers to Discharge:  [] Difficulty returning to present living arrangement  [] Will require assistance with placement post discharge  [] There is no local substance abuse disorder programs available to the patient  [] Needs referral to    Discharge Planning Comments:Sury is to complete medical detox on 2/11/2021. She is to be transported to a 30 day facility for long term treatment.

## 2021-02-11 NOTE — PROGRESS NOTES
Pt c/o of anxiety 2235 medicated per PRN order. At 2345 pt reported only slightly improvement in anxiety level. Pt c/o insomnia at 0100; medicated per PRN order.

## 2021-02-11 NOTE — ROUTINE PROCESS
Verbal shift change report given to YAKOV Jacobo RN (oncoming nurse) by IVON Peters RN (offgoing nurse). Report included the following information SBAR, Kardex, Intake/Output, MAR and Quality Measures.

## 2021-02-11 NOTE — ROUTINE PROCESS
Verbal shift change report given to Lloyd Rosa (oncoming nurse) by Yovani Swan RN (offgoing nurse). Report included the following information Kardex, Intake/Output, MAR and Med Rec Status.

## 2021-02-11 NOTE — PROGRESS NOTES
Dr. Aida Bernard in for daily assessment and discharge orders placed. Patient is for discharge to residential facility.

## 2021-02-11 NOTE — PROGRESS NOTES
Discharged for residential facility, ambulatory, alert and oriented x 4. A current smoker that refused smoking cessation medication and counseling. All personal belongings returned. Discharge instructions given and explained. All questions answered. Exsorted to front entrance via staff.